# Patient Record
Sex: MALE | Race: WHITE | NOT HISPANIC OR LATINO | URBAN - METROPOLITAN AREA
[De-identification: names, ages, dates, MRNs, and addresses within clinical notes are randomized per-mention and may not be internally consistent; named-entity substitution may affect disease eponyms.]

---

## 2017-08-28 PROBLEM — Z00.00 ENCOUNTER FOR PREVENTIVE HEALTH EXAMINATION: Status: ACTIVE | Noted: 2017-08-28

## 2023-04-25 ENCOUNTER — EMERGENCY (EMERGENCY)
Facility: HOSPITAL | Age: 38
LOS: 1 days | Discharge: HOME | End: 2023-04-25
Attending: STUDENT IN AN ORGANIZED HEALTH CARE EDUCATION/TRAINING PROGRAM | Admitting: STUDENT IN AN ORGANIZED HEALTH CARE EDUCATION/TRAINING PROGRAM
Payer: OTHER MISCELLANEOUS

## 2023-04-25 VITALS
SYSTOLIC BLOOD PRESSURE: 140 MMHG | OXYGEN SATURATION: 98 % | DIASTOLIC BLOOD PRESSURE: 89 MMHG | HEART RATE: 83 BPM | TEMPERATURE: 98 F | RESPIRATION RATE: 17 BRPM

## 2023-04-25 PROCEDURE — 99284 EMERGENCY DEPT VISIT MOD MDM: CPT

## 2023-04-25 RX ORDER — KETOROLAC TROMETHAMINE 30 MG/ML
15 SYRINGE (ML) INJECTION ONCE
Refills: 0 | Status: DISCONTINUED | OUTPATIENT
Start: 2023-04-25 | End: 2023-04-25

## 2023-04-25 RX ORDER — DIAZEPAM 5 MG
5 TABLET ORAL ONCE
Refills: 0 | Status: DISCONTINUED | OUTPATIENT
Start: 2023-04-25 | End: 2023-04-25

## 2023-04-25 RX ORDER — ACETAMINOPHEN 500 MG
975 TABLET ORAL ONCE
Refills: 0 | Status: COMPLETED | OUTPATIENT
Start: 2023-04-25 | End: 2023-04-25

## 2023-04-25 RX ORDER — LIDOCAINE 4 G/100G
1 CREAM TOPICAL ONCE
Refills: 0 | Status: COMPLETED | OUTPATIENT
Start: 2023-04-25 | End: 2023-04-25

## 2023-04-25 RX ADMIN — Medication 975 MILLIGRAM(S): at 14:52

## 2023-04-25 RX ADMIN — Medication 5 MILLIGRAM(S): at 14:52

## 2023-04-25 RX ADMIN — Medication 15 MILLIGRAM(S): at 14:52

## 2023-04-25 RX ADMIN — LIDOCAINE 1 PATCH: 4 CREAM TOPICAL at 14:51

## 2023-04-25 NOTE — ED PROVIDER NOTE - PROGRESS NOTE DETAILS
carlos eduardo ANDREW PGY-1: Patient status post medications.  Patient feels better.  Patient able to walk.  Looks more comfortable in chair.  Will discharge with sports medicine follow-up.  Patient with Tylenol ibuprofen at home patient is amenable to discharge.

## 2023-04-25 NOTE — ED PROVIDER NOTE - CLINICAL SUMMARY MEDICAL DECISION MAKING FREE TEXT BOX
MDM & Summary:  patient neurovascularly intact complaining of midline back pain in the setting of possible overexertion injury, patient with numbness over the anterior lateral aspect of his feet.  DDx:   Exam not consistent with cord compression versus fracture.  Exam is consistent with strain.  No concern for neurovascular compromise at this time given exam.  No fevers to suggest infectious cause.  Labs:   None  Imaging:  none  Tx:  Tylenol ibuprofen lidocaine patch Valium, patient will not be driving  Consults/Resources:  no  Dispo: likely home with referral to sports medicine    Triage note reviewed. VS reviewed. EKG reviewed and documented in "RESULTS" section, if possible at given time.     DDx in MDM includes the most likely ddx, but is not limited to solely what is listed. Progress notes written as needed, and are included in "PROGRESS NOTE" section below.       Medical, family, and social determinants of health reviewed and discussed w/ pt/family/caretaker, when allowable, and is incorporated into note above, whenever possible.

## 2023-04-25 NOTE — ED PROVIDER NOTE - OBJECTIVE STATEMENT
HPI & ROS: 37-year-old male with no relevant past medical history, has injured L1 and L2 in the past not treated surgically, no surgeries, presenting with acute back pain.  Approximately 11 AM today, patient was lifting a suitcase.  When he lifted a suitcase felt a pop in his lower back midline.  And now is having radiating pain on his right side down the front of his leg to the lateral aspect of his calf.  Patient without saddle anesthesia.  Patient without incontinence.  Patient took no pain medication for this 10 out of 10 radiating pain.  Patient not allergic to anything.  Patient neurovascularly intact.   Patient is an occasional smoker occasional drinker, no IV drug use.  No recent chills fevers.  No chest pain no shortness of breath.    Exam as stated below:   CONSTITUTIONAL: In pain  SKIN: Warm dry. No rashes noted.   HEAD: NCAT.   EYES: No scleral icterus. Conjunctiva pink.  ENT: Posterior pharynx with no erythema.  ABD: Soft. Non-tender. Not distended.   MSK: No pedal edema. No calf tenderness.  NEURO: UE/LE grossly intact. Motor UE/LE sensation grossly intact. CN II-XII grossly intact.   Patient standing in a flexed position, feels as if he is in distress.  10 out of 10 pain.  Patient with diminished feeling over his right anterior thigh right lateral calf.  Can feel me touching however.  PSYCH: Cooperative, appropriate.

## 2023-04-25 NOTE — ED ADULT TRIAGE NOTE - CHIEF COMPLAINT QUOTE
Presents to ED c/o R sided lower back pain radiating down to R leg after lifting up a suitcase. PMH of L1 and L2 herniation. Pt ambulatory.

## 2023-04-25 NOTE — ED ADULT NURSE NOTE - OBJECTIVE STATEMENT
Patient received in stretcher. AOX4. Respirations even and unlabored. Spontaneous movement of all extremities noted. Presents to ER c/o " I pulled my back" As per patient he was lifting a suitcase out of his truck when he felt a pop in his back. Patient reports hx of "back problems" and wasn't able to straighten his back during incident due to pain. Denies urinary or fectal incontinence. Patient reports pain starts at lumbar region of back and radiates down BL legs associated with numbness/ tingling. Medicated as per MD orders. Comfort and safety maintained. All current care needs met. Care plan continued Kumar LEON

## 2023-04-25 NOTE — ED PROVIDER NOTE - PATIENT PORTAL LINK FT
You can access the FollowMyHealth Patient Portal offered by [facility] by registering at the following website: [email or web address]. By joining [facility]’s FollowMyHealth portal, you will also be able to view your health information using other applications (apps) compatible with our system.

## 2023-04-25 NOTE — ED PROVIDER NOTE - ATTENDING CONTRIBUTION TO CARE
Dr. Hernandez, Attending Physician-  I performed a face to face bedside interview with patient regarding history of present illness, review of symptoms and past medical history. I completed an independent physical exam.  I have discussed patient's plan of care with the resident.    37M, lower back disk disease, who presents with lower back pain. No prior back surgeries. Was lifting a heavy suitcase earlier this morning when he felt a "pop" and then associated pain with radiation down the RLE. Still able to ambulate but with pain. No IVDU. No fevers/chills. No bowel/bladder incont. No saddle anesthesia. No urine retention. Has not trialed analgesia yet. On ROS, denies headaches, fevers, chills, cough, sputum, cp, sob, abdominal pain, nvd, dysuria, hematuria, recent travel, syncope, black/bloody stools. Physical: afebrile, non-toxic appearing, rrr, ctabl, abdomen soft, SILT over S/S/T/DP/SP bilaterally, able to ambulate, no midline spinal ttp or stepoffs. Plan: analgeia, dispo pending.

## 2023-04-25 NOTE — ED PROVIDER NOTE - NSFOLLOWUPINSTRUCTIONS_ED_ALL_ED_FT
-  you came to the emergency room because you hurt your back.  I gave you pain medications and glad that they helped.  Treat this at home with Tylenol and ibuprofen and rest.  Sometimes hot or cold pack works.  I am giving you a referral to our sports medicine clinic.  They help with nonoperative issues such as this.  They may be able to help you.  Please follow-up with your primary care physician you may need physical therapy going forward.  Reasons to come back to the emergency room include but are not limited to chest pain shortness of breath if you have no sensation in your lower extremity or any changes color.  And if pain is not controlled.    - Your testing/exams was/were reassuring that dangerous emergencies/conditions are less likely to be occurring or to have occurred.    - Take all medications as directed.    - For pain or fever you can take ibuprofen (motrin, advil) or acetaminophen (tylenol) as needed, as directed on packaging.  - Follow up with your primary doctor within 5 days as directed.  - If you had labs or imaging done, you were given copies of all labs and/or imaging results from your er visit--please take them with you to your follow up appointments.  - If needed, call patient access services at [phone] to find a primary care physician (PCP). Call this number to follow up with a specialty service, such as the spine clinic. If you need this, call and say you were recently in the emergency department and you are calling, per my orders.   - Make sure you do not require a primary care physician's referral if you make a specialty clinic appointment directly. Some insurance requires you to see your PCP, get a referral, then make a specialty appointment.

## 2023-06-21 ENCOUNTER — APPOINTMENT (OUTPATIENT)
Dept: OTOLARYNGOLOGY | Facility: CLINIC | Age: 38
End: 2023-06-21

## 2025-06-14 ENCOUNTER — NON-APPOINTMENT (OUTPATIENT)
Age: 40
End: 2025-06-14

## 2025-06-16 ENCOUNTER — APPOINTMENT (OUTPATIENT)
Dept: ULTRASOUND IMAGING | Facility: CLINIC | Age: 40
End: 2025-06-16
Payer: COMMERCIAL

## 2025-06-16 ENCOUNTER — APPOINTMENT (OUTPATIENT)
Dept: UROLOGY | Facility: CLINIC | Age: 40
End: 2025-06-16
Payer: COMMERCIAL

## 2025-06-16 VITALS
BODY MASS INDEX: 28.79 KG/M2 | HEART RATE: 82 BPM | OXYGEN SATURATION: 97 % | SYSTOLIC BLOOD PRESSURE: 112 MMHG | WEIGHT: 190 LBS | DIASTOLIC BLOOD PRESSURE: 79 MMHG | TEMPERATURE: 96.8 F | HEIGHT: 68 IN

## 2025-06-16 PROCEDURE — 76870 US EXAM SCROTUM: CPT

## 2025-06-16 PROCEDURE — G2211 COMPLEX E/M VISIT ADD ON: CPT | Mod: NC

## 2025-06-16 PROCEDURE — 99204 OFFICE O/P NEW MOD 45 MIN: CPT

## 2025-06-17 LAB
APPEARANCE: ABNORMAL
BACTERIA: NEGATIVE /HPF
BILIRUBIN URINE: NEGATIVE
BLOOD URINE: NEGATIVE
CAST: 2 /LPF
COLOR: YELLOW
EPITHELIAL CELLS: 1 /HPF
GLUCOSE QUALITATIVE U: NEGATIVE MG/DL
KETONES URINE: ABNORMAL MG/DL
LEUKOCYTE ESTERASE URINE: NEGATIVE
MICROSCOPIC-UA: NORMAL
NITRITE URINE: NEGATIVE
PH URINE: 5
PROTEIN URINE: NEGATIVE MG/DL
RED BLOOD CELLS URINE: 0 /HPF
SPECIFIC GRAVITY URINE: 1.03
UROBILINOGEN URINE: 0.2 MG/DL
WHITE BLOOD CELLS URINE: 0 /HPF

## 2025-06-18 LAB — BACTERIA UR CULT: NORMAL

## 2025-06-19 ENCOUNTER — APPOINTMENT (OUTPATIENT)
Dept: UROLOGY | Facility: CLINIC | Age: 40
End: 2025-06-19

## 2025-07-02 ENCOUNTER — APPOINTMENT (OUTPATIENT)
Dept: INTERNAL MEDICINE | Facility: CLINIC | Age: 40
End: 2025-07-02